# Patient Record
Sex: FEMALE | Race: WHITE | NOT HISPANIC OR LATINO
[De-identification: names, ages, dates, MRNs, and addresses within clinical notes are randomized per-mention and may not be internally consistent; named-entity substitution may affect disease eponyms.]

---

## 2019-04-18 ENCOUNTER — APPOINTMENT (OUTPATIENT)
Dept: OTOLARYNGOLOGY | Facility: CLINIC | Age: 39
End: 2019-04-18
Payer: COMMERCIAL

## 2019-04-18 DIAGNOSIS — Z87.19 PERSONAL HISTORY OF OTHER DISEASES OF THE DIGESTIVE SYSTEM: ICD-10-CM

## 2019-04-18 DIAGNOSIS — J34.2 DEVIATED NASAL SEPTUM: ICD-10-CM

## 2019-04-18 DIAGNOSIS — Z78.9 OTHER SPECIFIED HEALTH STATUS: ICD-10-CM

## 2019-04-18 DIAGNOSIS — Z80.9 FAMILY HISTORY OF MALIGNANT NEOPLASM, UNSPECIFIED: ICD-10-CM

## 2019-04-18 PROBLEM — Z00.00 ENCOUNTER FOR PREVENTIVE HEALTH EXAMINATION: Status: ACTIVE | Noted: 2019-04-18

## 2019-04-18 PROCEDURE — 31238 NSL/SINS NDSC SRG NSL HEMRRG: CPT

## 2019-04-18 PROCEDURE — 99203 OFFICE O/P NEW LOW 30 MIN: CPT | Mod: 25

## 2019-04-18 RX ORDER — BETAMETHASONE DIPROPIONATE 0.5 MG/ML
0.05 LOTION, AUGMENTED TOPICAL
Qty: 60 | Refills: 0 | Status: ACTIVE | COMMUNITY
Start: 2018-11-27

## 2019-04-18 RX ORDER — BIFIDOBACTERIUM LONGUM 10MM CELL
CAPSULE ORAL
Refills: 0 | Status: ACTIVE | COMMUNITY

## 2019-04-18 RX ORDER — ATOVAQUONE AND PROGUANIL HYDROCHLORIDE 250; 100 MG/1; MG/1
250-100 TABLET, FILM COATED ORAL
Qty: 16 | Refills: 0 | Status: ACTIVE | COMMUNITY
Start: 2019-04-12

## 2019-04-18 RX ORDER — CETIRIZINE HYDROCHLORIDE 5 MG/1
5 TABLET ORAL
Refills: 0 | Status: ACTIVE | COMMUNITY

## 2019-04-18 NOTE — HISTORY OF PRESENT ILLNESS
[de-identified] : Patient with history of severe left-sided epistaxis last evening treated with 4 tampons in the nose which she bled through immediately. Reports that there was epistaxis on the opposite side and going down her throat. Presented to urgent care where the bleeding ultimately stopped. Patient had septoplasty in 2016 which improved her per nosebleeds on the left side. No blood thinners, history of high blood pressure, nasal trauma, or other intranasal medications

## 2019-04-18 NOTE — ASSESSMENT
[FreeTextEntry1] : \par \par Epistaxis, status post cautery.  The patient did well and was hemostatic at the conclusion.  I recommended nasal rest, no nose blowing, saline spray, and ointment to be applied for 5 times a day.  The pt will refrain from nose blowing or other manipulation.  I also recommended a humidifier.  The patient will follow up if they have any further bleeding\par \par

## 2019-04-29 ENCOUNTER — APPOINTMENT (OUTPATIENT)
Dept: OTOLARYNGOLOGY | Facility: CLINIC | Age: 39
End: 2019-04-29
Payer: COMMERCIAL

## 2019-04-29 VITALS
HEIGHT: 64 IN | WEIGHT: 112 LBS | HEART RATE: 67 BPM | BODY MASS INDEX: 19.12 KG/M2 | DIASTOLIC BLOOD PRESSURE: 76 MMHG | SYSTOLIC BLOOD PRESSURE: 101 MMHG

## 2019-04-29 PROCEDURE — 99213 OFFICE O/P EST LOW 20 MIN: CPT

## 2019-04-29 NOTE — ASSESSMENT
[FreeTextEntry1] : Epistaxis, status post cautery April 18. I did not recommend repeat cautery on the right side because of the risk of compromising the blood supply to the septum with bilateral cautery. It does not seem severe enough to warrant cautery at this time. Resume normal nasal hygiene\par \par

## 2019-04-29 NOTE — HISTORY OF PRESENT ILLNESS
[de-identified] : Patient Last seen April 18 with history of severe left-sided epistaxis last evening treated with 4 tampons in the nose which she bled through immediately. Reports that there was epistaxis on the opposite side and going down her throat. Presented to urgent care where the bleeding ultimately stopped. Patient had septoplasty in 2016 which improved her per nosebleeds on the left side. No blood thinners, history of high blood pressure, nasal trauma, or other intranasal medications\par \par Cauterized with silver nitrate, no bleeds on the left since then.  Does have some spotting of blood now on the right side.  She is concerned because she will be flying in 2 days

## 2019-12-06 ENCOUNTER — APPOINTMENT (OUTPATIENT)
Dept: ANTEPARTUM | Facility: CLINIC | Age: 39
End: 2019-12-06
Payer: COMMERCIAL

## 2019-12-06 PROCEDURE — 76801 OB US < 14 WKS SINGLE FETUS: CPT

## 2019-12-06 PROCEDURE — 76813 OB US NUCHAL MEAS 1 GEST: CPT

## 2020-01-03 ENCOUNTER — APPOINTMENT (OUTPATIENT)
Dept: ANTEPARTUM | Facility: CLINIC | Age: 40
End: 2020-01-03
Payer: COMMERCIAL

## 2020-01-03 PROCEDURE — 76817 TRANSVAGINAL US OBSTETRIC: CPT

## 2020-01-03 PROCEDURE — 76811 OB US DETAILED SNGL FETUS: CPT

## 2020-01-10 ENCOUNTER — APPOINTMENT (OUTPATIENT)
Dept: ANTEPARTUM | Facility: CLINIC | Age: 40
End: 2020-01-10
Payer: COMMERCIAL

## 2020-01-10 PROCEDURE — 76946 ECHO GUIDE FOR AMNIOCENTESIS: CPT

## 2020-01-10 PROCEDURE — 59000 AMNIOCENTESIS DIAGNOSTIC: CPT

## 2020-01-15 ENCOUNTER — TRANSCRIPTION ENCOUNTER (OUTPATIENT)
Age: 40
End: 2020-01-15

## 2020-01-31 ENCOUNTER — APPOINTMENT (OUTPATIENT)
Dept: ANTEPARTUM | Facility: CLINIC | Age: 40
End: 2020-01-31
Payer: COMMERCIAL

## 2020-01-31 PROCEDURE — 76811 OB US DETAILED SNGL FETUS: CPT

## 2020-01-31 PROCEDURE — 76817 TRANSVAGINAL US OBSTETRIC: CPT

## 2020-04-09 ENCOUNTER — APPOINTMENT (OUTPATIENT)
Dept: ANTEPARTUM | Facility: CLINIC | Age: 40
End: 2020-04-09
Payer: COMMERCIAL

## 2020-04-09 PROCEDURE — 76816 OB US FOLLOW-UP PER FETUS: CPT

## 2020-04-09 PROCEDURE — 76819 FETAL BIOPHYS PROFIL W/O NST: CPT

## 2020-05-08 ENCOUNTER — APPOINTMENT (OUTPATIENT)
Dept: ANTEPARTUM | Facility: CLINIC | Age: 40
End: 2020-05-08
Payer: COMMERCIAL

## 2020-05-08 PROCEDURE — 76819 FETAL BIOPHYS PROFIL W/O NST: CPT

## 2020-05-08 PROCEDURE — 76816 OB US FOLLOW-UP PER FETUS: CPT

## 2020-05-25 ENCOUNTER — OUTPATIENT (OUTPATIENT)
Dept: OUTPATIENT SERVICES | Facility: HOSPITAL | Age: 40
LOS: 1 days | End: 2020-05-25
Payer: COMMERCIAL

## 2020-05-25 DIAGNOSIS — O26.899 OTHER SPECIFIED PREGNANCY RELATED CONDITIONS, UNSPECIFIED TRIMESTER: ICD-10-CM

## 2020-05-25 DIAGNOSIS — Z3A.00 WEEKS OF GESTATION OF PREGNANCY NOT SPECIFIED: ICD-10-CM

## 2020-05-25 LAB
APPEARANCE UR: CLEAR — SIGNIFICANT CHANGE UP
BILIRUB UR-MCNC: NEGATIVE — SIGNIFICANT CHANGE UP
COLOR SPEC: YELLOW — SIGNIFICANT CHANGE UP
DIFF PNL FLD: NEGATIVE — SIGNIFICANT CHANGE UP
GLUCOSE UR QL: NEGATIVE — SIGNIFICANT CHANGE UP
HCT VFR BLD CALC: 43.4 % — SIGNIFICANT CHANGE UP (ref 34.5–45)
HGB BLD-MCNC: 14.5 G/DL — SIGNIFICANT CHANGE UP (ref 11.5–15.5)
KETONES UR-MCNC: 15 MG/DL
LEUKOCYTE ESTERASE UR-ACNC: NEGATIVE — SIGNIFICANT CHANGE UP
MCHC RBC-ENTMCNC: 31.2 PG — SIGNIFICANT CHANGE UP (ref 27–34)
MCHC RBC-ENTMCNC: 33.4 GM/DL — SIGNIFICANT CHANGE UP (ref 32–36)
MCV RBC AUTO: 93.3 FL — SIGNIFICANT CHANGE UP (ref 80–100)
NITRITE UR-MCNC: NEGATIVE — SIGNIFICANT CHANGE UP
NRBC # BLD: 0 /100 WBCS — SIGNIFICANT CHANGE UP (ref 0–0)
PH UR: 6 — SIGNIFICANT CHANGE UP (ref 5–8)
PLATELET # BLD AUTO: 190 K/UL — SIGNIFICANT CHANGE UP (ref 150–400)
PROT UR-MCNC: NEGATIVE MG/DL — SIGNIFICANT CHANGE UP
RBC # BLD: 4.65 M/UL — SIGNIFICANT CHANGE UP (ref 3.8–5.2)
RBC # FLD: 12.7 % — SIGNIFICANT CHANGE UP (ref 10.3–14.5)
SP GR SPEC: 1.01 — SIGNIFICANT CHANGE UP (ref 1–1.03)
UROBILINOGEN FLD QL: 0.2 E.U./DL — SIGNIFICANT CHANGE UP
WBC # BLD: 11.61 K/UL — HIGH (ref 3.8–10.5)
WBC # FLD AUTO: 11.61 K/UL — HIGH (ref 3.8–10.5)

## 2020-05-25 PROCEDURE — 81003 URINALYSIS AUTO W/O SCOPE: CPT

## 2020-05-25 PROCEDURE — 96360 HYDRATION IV INFUSION INIT: CPT

## 2020-05-25 PROCEDURE — 76818 FETAL BIOPHYS PROFILE W/NST: CPT

## 2020-05-25 PROCEDURE — 99214 OFFICE O/P EST MOD 30 MIN: CPT

## 2020-05-25 PROCEDURE — 94760 N-INVAS EAR/PLS OXIMETRY 1: CPT

## 2020-05-25 PROCEDURE — 36415 COLL VENOUS BLD VENIPUNCTURE: CPT

## 2020-05-25 PROCEDURE — 85027 COMPLETE CBC AUTOMATED: CPT

## 2020-05-25 RX ORDER — SODIUM CHLORIDE 9 MG/ML
1000 INJECTION, SOLUTION INTRAVENOUS ONCE
Refills: 0 | Status: COMPLETED | OUTPATIENT
Start: 2020-05-25 | End: 2020-05-25

## 2020-05-25 RX ADMIN — SODIUM CHLORIDE 1000 MILLILITER(S): 9 INJECTION, SOLUTION INTRAVENOUS at 17:30

## 2020-05-26 ENCOUNTER — APPOINTMENT (OUTPATIENT)
Dept: ANTEPARTUM | Facility: CLINIC | Age: 40
End: 2020-05-26
Payer: COMMERCIAL

## 2020-05-26 PROCEDURE — 76816 OB US FOLLOW-UP PER FETUS: CPT

## 2020-05-26 PROCEDURE — 76819 FETAL BIOPHYS PROFIL W/O NST: CPT

## 2020-06-02 ENCOUNTER — APPOINTMENT (OUTPATIENT)
Dept: ANTEPARTUM | Facility: CLINIC | Age: 40
End: 2020-06-02
Payer: COMMERCIAL

## 2020-06-02 ENCOUNTER — INPATIENT (INPATIENT)
Facility: HOSPITAL | Age: 40
LOS: 2 days | Discharge: HOME | End: 2020-06-05
Attending: OBSTETRICS & GYNECOLOGY | Admitting: OBSTETRICS & GYNECOLOGY
Payer: COMMERCIAL

## 2020-06-02 VITALS — HEIGHT: 64 IN | WEIGHT: 138.01 LBS

## 2020-06-02 DIAGNOSIS — Z3A.00 WEEKS OF GESTATION OF PREGNANCY NOT SPECIFIED: ICD-10-CM

## 2020-06-02 DIAGNOSIS — O26.899 OTHER SPECIFIED PREGNANCY RELATED CONDITIONS, UNSPECIFIED TRIMESTER: ICD-10-CM

## 2020-06-02 LAB
BASOPHILS # BLD AUTO: 0.02 K/UL — SIGNIFICANT CHANGE UP (ref 0–0.2)
BASOPHILS NFR BLD AUTO: 0.2 % — SIGNIFICANT CHANGE UP (ref 0–2)
EOSINOPHIL # BLD AUTO: 0.01 K/UL — SIGNIFICANT CHANGE UP (ref 0–0.5)
EOSINOPHIL NFR BLD AUTO: 0.1 % — SIGNIFICANT CHANGE UP (ref 0–6)
HCT VFR BLD CALC: 39.9 % — SIGNIFICANT CHANGE UP (ref 34.5–45)
HGB BLD-MCNC: 13.6 G/DL — SIGNIFICANT CHANGE UP (ref 11.5–15.5)
IMM GRANULOCYTES NFR BLD AUTO: 0.7 % — SIGNIFICANT CHANGE UP (ref 0–1.5)
LYMPHOCYTES # BLD AUTO: 1.4 K/UL — SIGNIFICANT CHANGE UP (ref 1–3.3)
LYMPHOCYTES # BLD AUTO: 15.2 % — SIGNIFICANT CHANGE UP (ref 13–44)
MCHC RBC-ENTMCNC: 31.6 PG — SIGNIFICANT CHANGE UP (ref 27–34)
MCHC RBC-ENTMCNC: 34.1 GM/DL — SIGNIFICANT CHANGE UP (ref 32–36)
MCV RBC AUTO: 92.6 FL — SIGNIFICANT CHANGE UP (ref 80–100)
MONOCYTES # BLD AUTO: 0.55 K/UL — SIGNIFICANT CHANGE UP (ref 0–0.9)
MONOCYTES NFR BLD AUTO: 6 % — SIGNIFICANT CHANGE UP (ref 2–14)
NEUTROPHILS # BLD AUTO: 7.18 K/UL — SIGNIFICANT CHANGE UP (ref 1.8–7.4)
NEUTROPHILS NFR BLD AUTO: 77.8 % — HIGH (ref 43–77)
NRBC # BLD: 0 /100 WBCS — SIGNIFICANT CHANGE UP (ref 0–0)
PLATELET # BLD AUTO: 193 K/UL — SIGNIFICANT CHANGE UP (ref 150–400)
RBC # BLD: 4.31 M/UL — SIGNIFICANT CHANGE UP (ref 3.8–5.2)
RBC # FLD: 12.4 % — SIGNIFICANT CHANGE UP (ref 10.3–14.5)
WBC # BLD: 9.22 K/UL — SIGNIFICANT CHANGE UP (ref 3.8–10.5)
WBC # FLD AUTO: 9.22 K/UL — SIGNIFICANT CHANGE UP (ref 3.8–10.5)

## 2020-06-02 PROCEDURE — 76816 OB US FOLLOW-UP PER FETUS: CPT

## 2020-06-02 PROCEDURE — 76819 FETAL BIOPHYS PROFIL W/O NST: CPT

## 2020-06-02 RX ORDER — OXYTOCIN 10 UNIT/ML
2 VIAL (ML) INJECTION
Qty: 30 | Refills: 0 | Status: DISCONTINUED | OUTPATIENT
Start: 2020-06-02 | End: 2020-06-03

## 2020-06-02 RX ORDER — SODIUM CHLORIDE 9 MG/ML
1000 INJECTION, SOLUTION INTRAVENOUS
Refills: 0 | Status: DISCONTINUED | OUTPATIENT
Start: 2020-06-02 | End: 2020-06-03

## 2020-06-02 RX ORDER — CITRIC ACID/SODIUM CITRATE 300-500 MG
15 SOLUTION, ORAL ORAL ONCE
Refills: 0 | Status: DISCONTINUED | OUTPATIENT
Start: 2020-06-02 | End: 2020-06-03

## 2020-06-02 RX ORDER — OXYTOCIN 10 UNIT/ML
333.33 VIAL (ML) INJECTION
Qty: 20 | Refills: 0 | Status: DISCONTINUED | OUTPATIENT
Start: 2020-06-02 | End: 2020-06-03

## 2020-06-03 ENCOUNTER — RESULT REVIEW (OUTPATIENT)
Age: 40
End: 2020-06-03

## 2020-06-03 LAB
BLD GP AB SCN SERPL QL: NEGATIVE — SIGNIFICANT CHANGE UP
RH IG SCN BLD-IMP: POSITIVE — SIGNIFICANT CHANGE UP
RH IG SCN BLD-IMP: POSITIVE — SIGNIFICANT CHANGE UP
SARS-COV-2 RNA SPEC QL NAA+PROBE: SIGNIFICANT CHANGE UP
T PALLIDUM AB TITR SER: NEGATIVE — SIGNIFICANT CHANGE UP

## 2020-06-03 PROCEDURE — 88307 TISSUE EXAM BY PATHOLOGIST: CPT | Mod: 26

## 2020-06-03 RX ORDER — SIMETHICONE 80 MG/1
80 TABLET, CHEWABLE ORAL EVERY 4 HOURS
Refills: 0 | Status: DISCONTINUED | OUTPATIENT
Start: 2020-06-03 | End: 2020-06-05

## 2020-06-03 RX ORDER — ACETAMINOPHEN 500 MG
1000 TABLET ORAL ONCE
Refills: 0 | Status: COMPLETED | OUTPATIENT
Start: 2020-06-03 | End: 2020-06-03

## 2020-06-03 RX ORDER — GENTAMICIN SULFATE 40 MG/ML
240 VIAL (ML) INJECTION EVERY 24 HOURS
Refills: 0 | Status: DISCONTINUED | OUTPATIENT
Start: 2020-06-04 | End: 2020-06-04

## 2020-06-03 RX ORDER — HYDROCORTISONE 1 %
1 OINTMENT (GRAM) TOPICAL EVERY 6 HOURS
Refills: 0 | Status: DISCONTINUED | OUTPATIENT
Start: 2020-06-03 | End: 2020-06-05

## 2020-06-03 RX ORDER — GENTAMICIN SULFATE 40 MG/ML
VIAL (ML) INJECTION
Refills: 0 | Status: DISCONTINUED | OUTPATIENT
Start: 2020-06-03 | End: 2020-06-04

## 2020-06-03 RX ORDER — OXYTOCIN 10 UNIT/ML
2 VIAL (ML) INJECTION
Qty: 30 | Refills: 0 | Status: DISCONTINUED | OUTPATIENT
Start: 2020-06-03 | End: 2020-06-03

## 2020-06-03 RX ORDER — SODIUM CHLORIDE 9 MG/ML
3 INJECTION INTRAMUSCULAR; INTRAVENOUS; SUBCUTANEOUS EVERY 8 HOURS
Refills: 0 | Status: DISCONTINUED | OUTPATIENT
Start: 2020-06-03 | End: 2020-06-05

## 2020-06-03 RX ORDER — DIBUCAINE 1 %
1 OINTMENT (GRAM) RECTAL EVERY 6 HOURS
Refills: 0 | Status: DISCONTINUED | OUTPATIENT
Start: 2020-06-03 | End: 2020-06-05

## 2020-06-03 RX ORDER — MAGNESIUM HYDROXIDE 400 MG/1
30 TABLET, CHEWABLE ORAL
Refills: 0 | Status: DISCONTINUED | OUTPATIENT
Start: 2020-06-03 | End: 2020-06-05

## 2020-06-03 RX ORDER — FAMOTIDINE 10 MG/ML
20 INJECTION INTRAVENOUS ONCE
Refills: 0 | Status: COMPLETED | OUTPATIENT
Start: 2020-06-03 | End: 2020-06-04

## 2020-06-03 RX ORDER — PRAMOXINE HYDROCHLORIDE 150 MG/15G
1 AEROSOL, FOAM RECTAL EVERY 4 HOURS
Refills: 0 | Status: DISCONTINUED | OUTPATIENT
Start: 2020-06-03 | End: 2020-06-05

## 2020-06-03 RX ORDER — IBUPROFEN 200 MG
600 TABLET ORAL EVERY 6 HOURS
Refills: 0 | Status: COMPLETED | OUTPATIENT
Start: 2020-06-03 | End: 2021-05-02

## 2020-06-03 RX ORDER — BENZOCAINE 10 %
1 GEL (GRAM) MUCOUS MEMBRANE EVERY 6 HOURS
Refills: 0 | Status: DISCONTINUED | OUTPATIENT
Start: 2020-06-03 | End: 2020-06-05

## 2020-06-03 RX ORDER — TETANUS TOXOID, REDUCED DIPHTHERIA TOXOID AND ACELLULAR PERTUSSIS VACCINE, ADSORBED 5; 2.5; 8; 8; 2.5 [IU]/.5ML; [IU]/.5ML; UG/.5ML; UG/.5ML; UG/.5ML
0.5 SUSPENSION INTRAMUSCULAR ONCE
Refills: 0 | Status: DISCONTINUED | OUTPATIENT
Start: 2020-06-03 | End: 2020-06-05

## 2020-06-03 RX ORDER — GENTAMICIN SULFATE 40 MG/ML
240 VIAL (ML) INJECTION ONCE
Refills: 0 | Status: COMPLETED | OUTPATIENT
Start: 2020-06-03 | End: 2020-06-03

## 2020-06-03 RX ORDER — OXYCODONE HYDROCHLORIDE 5 MG/1
5 TABLET ORAL
Refills: 0 | Status: DISCONTINUED | OUTPATIENT
Start: 2020-06-03 | End: 2020-06-05

## 2020-06-03 RX ORDER — FENTANYL/BUPIVACAINE/NS/PF 2MCG/ML-.1
250 PLASTIC BAG, INJECTION (ML) INJECTION
Refills: 0 | Status: DISCONTINUED | OUTPATIENT
Start: 2020-06-03 | End: 2020-06-03

## 2020-06-03 RX ORDER — OXYTOCIN 10 UNIT/ML
333.33 VIAL (ML) INJECTION
Qty: 20 | Refills: 0 | Status: DISCONTINUED | OUTPATIENT
Start: 2020-06-03 | End: 2020-06-05

## 2020-06-03 RX ORDER — DIPHENHYDRAMINE HCL 50 MG
25 CAPSULE ORAL EVERY 6 HOURS
Refills: 0 | Status: DISCONTINUED | OUTPATIENT
Start: 2020-06-03 | End: 2020-06-05

## 2020-06-03 RX ORDER — OXYCODONE HYDROCHLORIDE 5 MG/1
5 TABLET ORAL ONCE
Refills: 0 | Status: DISCONTINUED | OUTPATIENT
Start: 2020-06-03 | End: 2020-06-05

## 2020-06-03 RX ORDER — METRONIDAZOLE 500 MG
500 TABLET ORAL EVERY 8 HOURS
Refills: 0 | Status: DISCONTINUED | OUTPATIENT
Start: 2020-06-03 | End: 2020-06-04

## 2020-06-03 RX ORDER — KETOROLAC TROMETHAMINE 30 MG/ML
30 SYRINGE (ML) INJECTION ONCE
Refills: 0 | Status: DISCONTINUED | OUTPATIENT
Start: 2020-06-03 | End: 2020-06-03

## 2020-06-03 RX ORDER — ACETAMINOPHEN 500 MG
975 TABLET ORAL
Refills: 0 | Status: DISCONTINUED | OUTPATIENT
Start: 2020-06-03 | End: 2020-06-05

## 2020-06-03 RX ORDER — LANOLIN
1 OINTMENT (GRAM) TOPICAL EVERY 6 HOURS
Refills: 0 | Status: DISCONTINUED | OUTPATIENT
Start: 2020-06-03 | End: 2020-06-05

## 2020-06-03 RX ORDER — AER TRAVELER 0.5 G/1
1 SOLUTION RECTAL; TOPICAL EVERY 4 HOURS
Refills: 0 | Status: DISCONTINUED | OUTPATIENT
Start: 2020-06-03 | End: 2020-06-05

## 2020-06-03 RX ADMIN — SODIUM CHLORIDE 3 MILLILITER(S): 9 INJECTION INTRAMUSCULAR; INTRAVENOUS; SUBCUTANEOUS at 14:38

## 2020-06-03 RX ADMIN — Medication 500 MILLIGRAM(S): at 23:06

## 2020-06-03 RX ADMIN — Medication 500 MILLIGRAM(S): at 14:54

## 2020-06-03 RX ADMIN — Medication 212 MILLIGRAM(S): at 08:55

## 2020-06-03 RX ADMIN — Medication 400 MILLIGRAM(S): at 10:30

## 2020-06-03 RX ADMIN — Medication 1 SPRAY(S): at 23:11

## 2020-06-03 RX ADMIN — Medication 975 MILLIGRAM(S): at 23:06

## 2020-06-03 RX ADMIN — Medication 2 MILLIUNIT(S)/MIN: at 01:36

## 2020-06-03 RX ADMIN — Medication 30 MILLIGRAM(S): at 17:50

## 2020-06-03 RX ADMIN — Medication 1 APPLICATION(S): at 23:11

## 2020-06-03 NOTE — LACTATION INITIAL EVALUATION - AS DELIV COMPLICATIONS OB
respiratory distress/premature rupture of membranes prior to labor/nuchal cord/maternal fever/shoulder dystocia

## 2020-06-04 ENCOUNTER — TRANSCRIPTION ENCOUNTER (OUTPATIENT)
Age: 40
End: 2020-06-04

## 2020-06-04 RX ORDER — IBUPROFEN 200 MG
600 TABLET ORAL EVERY 6 HOURS
Refills: 0 | Status: DISCONTINUED | OUTPATIENT
Start: 2020-06-04 | End: 2020-06-05

## 2020-06-04 RX ORDER — FAMOTIDINE 10 MG/ML
40 INJECTION INTRAVENOUS
Refills: 0 | Status: DISCONTINUED | OUTPATIENT
Start: 2020-06-04 | End: 2020-06-04

## 2020-06-04 RX ORDER — FAMOTIDINE 10 MG/ML
20 INJECTION INTRAVENOUS
Refills: 0 | Status: DISCONTINUED | OUTPATIENT
Start: 2020-06-04 | End: 2020-06-05

## 2020-06-04 RX ADMIN — Medication 975 MILLIGRAM(S): at 06:52

## 2020-06-04 RX ADMIN — SODIUM CHLORIDE 3 MILLILITER(S): 9 INJECTION INTRAMUSCULAR; INTRAVENOUS; SUBCUTANEOUS at 02:41

## 2020-06-04 RX ADMIN — Medication 975 MILLIGRAM(S): at 18:59

## 2020-06-04 RX ADMIN — SODIUM CHLORIDE 3 MILLILITER(S): 9 INJECTION INTRAMUSCULAR; INTRAVENOUS; SUBCUTANEOUS at 13:58

## 2020-06-04 RX ADMIN — FAMOTIDINE 20 MILLIGRAM(S): 10 INJECTION INTRAVENOUS at 02:40

## 2020-06-04 RX ADMIN — Medication 975 MILLIGRAM(S): at 12:57

## 2020-06-04 NOTE — DISCHARGE NOTE OB - CARE PROVIDER_API CALL
Lexy Torres)  Obstetrics and Gynecology  [address] [place] [address], [place] [address]  Phone: [phone]  Fax: [phone]  Follow Up Time:

## 2020-06-04 NOTE — DISCHARGE NOTE OB - PATIENT PORTAL LINK FT
You can access the FollowMyHealth Patient Portal offered by [facility] by registering at the following website: [email or web address]. By joining [employer]’s FollowMyHealth portal, you will also be able to view your health information using other applications (apps) compatible with our system.

## 2020-06-04 NOTE — DISCHARGE NOTE OB - CARE PLAN
Principal Discharge DX:	Postpartum care following vaginal delivery  Goal:	healthy recovery  Assessment and plan of treatment:	follow up in office in 2 weeks

## 2020-06-05 VITALS
HEART RATE: 60 BPM | SYSTOLIC BLOOD PRESSURE: 108 MMHG | TEMPERATURE: 98 F | DIASTOLIC BLOOD PRESSURE: 72 MMHG | RESPIRATION RATE: 16 BRPM | OXYGEN SATURATION: 98 %

## 2020-06-05 LAB
CULTURE RESULTS: NO GROWTH — SIGNIFICANT CHANGE UP
SPECIMEN SOURCE: SIGNIFICANT CHANGE UP
SURGICAL PATHOLOGY STUDY: SIGNIFICANT CHANGE UP

## 2020-06-05 PROCEDURE — 99214 OFFICE O/P EST MOD 30 MIN: CPT

## 2020-06-05 PROCEDURE — 36415 COLL VENOUS BLD VENIPUNCTURE: CPT

## 2020-06-05 PROCEDURE — 86780 TREPONEMA PALLIDUM: CPT

## 2020-06-05 PROCEDURE — 86850 RBC ANTIBODY SCREEN: CPT

## 2020-06-05 PROCEDURE — 87086 URINE CULTURE/COLONY COUNT: CPT

## 2020-06-05 PROCEDURE — 86901 BLOOD TYPING SEROLOGIC RH(D): CPT

## 2020-06-05 PROCEDURE — 87635 SARS-COV-2 COVID-19 AMP PRB: CPT

## 2020-06-05 PROCEDURE — 85025 COMPLETE CBC W/AUTO DIFF WBC: CPT

## 2020-06-05 PROCEDURE — 88307 TISSUE EXAM BY PATHOLOGIST: CPT

## 2020-06-05 RX ADMIN — Medication 975 MILLIGRAM(S): at 10:27

## 2020-06-05 RX ADMIN — Medication 975 MILLIGRAM(S): at 00:44

## 2020-06-05 RX ADMIN — FAMOTIDINE 20 MILLIGRAM(S): 10 INJECTION INTRAVENOUS at 00:44

## 2020-06-05 NOTE — PROGRESS NOTE ADULT - ASSESSMENT
A/P 40y s/p , PPD #2, stable, meeting postpartum milestones . Course complicated by Triple I.  - Triple I: s/p gent/flagyl, afebrile  - Pain: well controlled on tylenol and motrin  - GI: Tolerating regular diet, colace PRN  - : urinating without difficulty/pain  -DVT prophylaxis: ambulating frequently  -Dispo: PPD 2, unless otherwise specified
A/P 40y s/p , PPD # 2, intrapartum course c/b intraamniotic infection- now afebrile & s/p abx, shoulder dystocia- pt meeting postpartum milestones, stable for d/c home.    1. Pain: well controlled on OPM  2. GI: Regular diet  3. : voiding spontaneously  4. DVT prophylaxis: SCDs, ambulate  5. Dispo: PPD 2, unless otherwise specified
A/P 40y s/p , PPD #1, stable, meeting postpartum milestones. Course complicated by Triple I.  - Triple I: on Gent/Flagyl for 24hr after delivery. Discontinue at 0745  - Pain: well controlled on tylenol and motrin  - GI: Tolerating regular diet, colace PRN  - : urinating without difficulty/pain  -DVT prophylaxis: ambulating frequently  -Dispo: PPD 2, unless otherwise specified

## 2020-06-05 NOTE — PROGRESS NOTE ADULT - SUBJECTIVE AND OBJECTIVE BOX
Patient evaluated at bedside this morning, resting comfortable in bed, no acute events overnight.  She reports pain is well controlled with tylenol and motrin  She denies headache, dizziness, chest pain, palpitations, shortness of breath, nausea, vomiting, heavy vaginal bleeding or perineal discomfort. Reports decrease in amount of vaginal bleeding and denies clots.  She has been ambulating without assistance, voiding spontaneously, and is breastfeeding.   Tolerating food well, without nausea/vomit.  Passing flatus.     Physical Exam:  T(C): 36.4 (06-05-20 @ 05:30), Max: 36.4 (06-05-20 @ 05:30)  HR: 59 (06-05-20 @ 05:30) (59 - 59)  BP: 103/65 (06-05-20 @ 05:30) (103/65 - 103/65)  RR: 17 (06-05-20 @ 05:30) (17 - 17)  SpO2: 98% (06-05-20 @ 05:30) (98% - 98%)    GA: NAD, A&O x 3  Abd: + BS, soft, nontender, nondistended, no rebound or guarding, uterus firm at midline, and 1fb below umbilicus  Perineum: intact, minimal swelling, small amount of bleeding on pad, no clots  Extremities: no swelling or calf tenderness            acetaminophen   Tablet .. 975 milliGRAM(s) Oral <User Schedule>  benzocaine 20%/menthol 0.5% Spray 1 Spray(s) Topical every 6 hours PRN  dibucaine 1% Ointment 1 Application(s) Topical every 6 hours PRN  diphenhydrAMINE 25 milliGRAM(s) Oral every 6 hours PRN  diphtheria/tetanus/pertussis (acellular) Vaccine (ADAcel) 0.5 milliLiter(s) IntraMuscular once  famotidine    Tablet 20 milliGRAM(s) Oral two times a day  hydrocortisone 1% Cream 1 Application(s) Topical every 6 hours PRN  ibuprofen  Tablet. 600 milliGRAM(s) Oral every 6 hours  lanolin Ointment 1 Application(s) Topical every 6 hours PRN  magnesium hydroxide Suspension 30 milliLiter(s) Oral two times a day PRN  oxyCODONE    IR 5 milliGRAM(s) Oral every 3 hours PRN  oxyCODONE    IR 5 milliGRAM(s) Oral once PRN  oxytocin Infusion 333.333 milliUNIT(s)/Min IV Continuous <Continuous>  pramoxine 1%/zinc 5% Cream 1 Application(s) Topical every 4 hours PRN  prenatal multivitamin 1 Tablet(s) Oral daily  simethicone 80 milliGRAM(s) Chew every 4 hours PRN  sodium chloride 0.9% lock flush 3 milliLiter(s) IV Push every 8 hours  witch hazel Pads 1 Application(s) Topical every 4 hours PRN
Patient evaluated at bedside this morning, resting comfortable in bed, no acute events overnight.  She reports pain is well controlled with tylenol and motrin  She denies headache, dizziness, chest pain, palpitations, shortness of breath, nausea, vomiting, heavy vaginal bleeding or perineal discomfort. Reports decrease in amount of vaginal bleeding and denies clots.  She has been ambulating without assistance, voiding spontaneously, and is breastfeeding.   Tolerating food well, without nausea/vomit.  Passing flatus.     Physical Exam:  T(C): 36.7 (06-04-20 @ 02:28), Max: 36.7 (06-04-20 @ 02:28)  HR: 60 (06-04-20 @ 02:28) (60 - 60)  BP: 127/80 (06-04-20 @ 02:28) (97/64 - 127/80)  RR: 17 (06-04-20 @ 02:28) (17 - 17)  SpO2: 100% (06-04-20 @ 02:28) (100% - 100%)    GA: NAD, A&O x 3  Abd: + BS, soft, nontender, nondistended, no rebound or guarding, uterus firm at midline, and 1fb below umbilicus  Perineum: intact, minimal swelling, small amount of bleeding on pad, no clots  Extremities: no swelling or calf tenderness                          13.6   9.22  )-----------( 193      ( 02 Jun 2020 23:34 )             39.9           acetaminophen   Tablet .. 975 milliGRAM(s) Oral <User Schedule>  benzocaine 20%/menthol 0.5% Spray 1 Spray(s) Topical every 6 hours PRN  dibucaine 1% Ointment 1 Application(s) Topical every 6 hours PRN  diphenhydrAMINE 25 milliGRAM(s) Oral every 6 hours PRN  diphtheria/tetanus/pertussis (acellular) Vaccine (ADAcel) 0.5 milliLiter(s) IntraMuscular once  gentamicin   IVPB      gentamicin   IVPB 240 milliGRAM(s) IV Intermittent every 24 hours  hydrocortisone 1% Cream 1 Application(s) Topical every 6 hours PRN  ibuprofen  Tablet. 600 milliGRAM(s) Oral every 6 hours  lanolin Ointment 1 Application(s) Topical every 6 hours PRN  magnesium hydroxide Suspension 30 milliLiter(s) Oral two times a day PRN  metroNIDAZOLE    Tablet 500 milliGRAM(s) Oral every 8 hours  oxyCODONE    IR 5 milliGRAM(s) Oral every 3 hours PRN  oxyCODONE    IR 5 milliGRAM(s) Oral once PRN  oxytocin Infusion 333.333 milliUNIT(s)/Min IV Continuous <Continuous>  pramoxine 1%/zinc 5% Cream 1 Application(s) Topical every 4 hours PRN  prenatal multivitamin 1 Tablet(s) Oral daily  simethicone 80 milliGRAM(s) Chew every 4 hours PRN  sodium chloride 0.9% lock flush 3 milliLiter(s) IV Push every 8 hours  witch hazel Pads 1 Application(s) Topical every 4 hours PRN
Patient evaluated at bedside.   She reports pain is well controlled with OPM  She denies headache, dizziness, chest pain, palpitations, shortness of breath, nausea, vomiting, heavy vaginal bleeding or perineal discomfort.  She has been ambulating without assistance, voiding spontaneously, and is breastfeeding.    Physical Exam:  T(C): 36.5 (20 @ 08:00), Max: 36.7 (20 @ 02:28)  HR: 55 (20 @ 06:30) (55 - 60)  BP: 113/74 (20 @ 06:30) (113/74 - 127/80)  RR: 17 (20 @ 06:30) (17 - 17)  SpO2: 100% (20 @ 06:30) (100% - 100%)    GA: NAD, A+0 x 3  Breasts: soft, nontender, no palpable masses  Abd: + BS, soft, nontender, nondistended, no rebound or guarding, uterus firm at midline, 2  fb below umbilicus  : lochia WNL  Extremities: no swelling or calf tenderness                            13.6   9.22  )-----------( 193      ( 2020 23:34 )             39.9         A/P 40y s/p , PPD #1  ,stable  1. Pain: well controlled on OPM  2. GI: Regular diet  3. : s.p leonard  4. DVT prophylaxis: SCDs, ambulate  5. Dispo: PPD 2, unless otherwise specified
Patient evaluated at bedside.   She reports pain is well controlled with OPM  She denies headache, dizziness, chest pain, palpitations, shortness of breathe, nausea, vomiting, heavy vaginal bleeding or perineal discomfort.  She has been ambulating without assistance, voiding spontaneously, and is breastfeeding.    Physical Exam:  T(C): 36.4 (06-05-20 @ 05:30), Max: 36.4 (06-05-20 @ 05:30)  HR: 59 (06-05-20 @ 05:30) (59 - 59)  BP: 103/65 (06-05-20 @ 05:30) (103/65 - 103/65)  RR: 17 (06-05-20 @ 05:30) (17 - 17)  SpO2: 98% (06-05-20 @ 05:30) (98% - 98%)    GA: NAD, A+0 x 3  Breasts: soft, nontender, no palpable masses  Abd: + BS, soft, nontender, nondistended, no rebound or guarding, uterus firm at midline, 2  fb below umbilicus  : lochia WNL  Extremities: no swelling or calf tenderness
Patient evaluated at bedside.   She reports pain is well controlled with OPM.  She has been ambulating without assistance, voiding spontaneously, passing gas, tolerating regular diet and is breastfeeding.    She denies HA, dizziness, CP, palpitations, SOB, n/v, or heavy vaginal bleeding.    Physical Exam:  vss  Gen: NAD  Abd: + BS, soft, nontender, nondistended, no rebound or guarding  uterus firm at midline,   fb below umbilicus  : lochia WNL  Extremities: no swelling or calf tenderness

## 2020-06-09 DIAGNOSIS — Z11.59 ENCOUNTER FOR SCREENING FOR OTHER VIRAL DISEASES: ICD-10-CM

## 2020-06-09 DIAGNOSIS — Z3A.38 38 WEEKS GESTATION OF PREGNANCY: ICD-10-CM

## 2020-06-09 DIAGNOSIS — O42.92 FULL-TERM PREMATURE RUPTURE OF MEMBRANES, UNSPECIFIED AS TO LENGTH OF TIME BETWEEN RUPTURE AND ONSET OF LABOR: ICD-10-CM

## 2020-06-09 DIAGNOSIS — O41.1230 CHORIOAMNIONITIS, THIRD TRIMESTER, NOT APPLICABLE OR UNSPECIFIED: ICD-10-CM

## 2020-06-09 DIAGNOSIS — Z34.03 ENCOUNTER FOR SUPERVISION OF NORMAL FIRST PREGNANCY, THIRD TRIMESTER: ICD-10-CM

## 2020-06-09 DIAGNOSIS — Z88.0 ALLERGY STATUS TO PENICILLIN: ICD-10-CM

## 2021-06-26 NOTE — DISCHARGE NOTE OB - IF BREASTFEEDING, HAVE A GLASS OF WATER, JUICE, OR LOW-FAT MILK EACH TIME YOU FEED YOUR BABY
Attempted to assess pt for Bronchial Hygeine Protocol- pt not in room at this time.
not examined
Statement Selected

## 2022-04-14 ENCOUNTER — RESULT REVIEW (OUTPATIENT)
Age: 42
End: 2022-04-14

## 2023-04-09 ENCOUNTER — NON-APPOINTMENT (OUTPATIENT)
Age: 43
End: 2023-04-09

## 2023-06-07 ENCOUNTER — APPOINTMENT (OUTPATIENT)
Dept: ANTEPARTUM | Facility: CLINIC | Age: 43
End: 2023-06-07
Payer: COMMERCIAL

## 2023-06-07 ENCOUNTER — ASOB RESULT (OUTPATIENT)
Age: 43
End: 2023-06-07

## 2023-06-07 PROCEDURE — 76817 TRANSVAGINAL US OBSTETRIC: CPT

## 2023-06-07 PROCEDURE — 76805 OB US >/= 14 WKS SNGL FETUS: CPT

## 2023-06-09 ENCOUNTER — ASOB RESULT (OUTPATIENT)
Age: 43
End: 2023-06-09

## 2023-06-09 ENCOUNTER — APPOINTMENT (OUTPATIENT)
Dept: ANTEPARTUM | Facility: CLINIC | Age: 43
End: 2023-06-09
Payer: COMMERCIAL

## 2023-06-09 PROCEDURE — 59000 AMNIOCENTESIS DIAGNOSTIC: CPT

## 2023-06-09 PROCEDURE — 76946 ECHO GUIDE FOR AMNIOCENTESIS: CPT

## 2023-07-11 ENCOUNTER — ASOB RESULT (OUTPATIENT)
Age: 43
End: 2023-07-11

## 2023-07-11 ENCOUNTER — APPOINTMENT (OUTPATIENT)
Dept: ANTEPARTUM | Facility: CLINIC | Age: 43
End: 2023-07-11
Payer: COMMERCIAL

## 2023-07-11 PROCEDURE — 76811 OB US DETAILED SNGL FETUS: CPT

## 2023-07-11 PROCEDURE — 76817 TRANSVAGINAL US OBSTETRIC: CPT

## 2023-08-24 ENCOUNTER — APPOINTMENT (OUTPATIENT)
Dept: ANTEPARTUM | Facility: CLINIC | Age: 43
End: 2023-08-24
Payer: COMMERCIAL

## 2023-08-24 ENCOUNTER — ASOB RESULT (OUTPATIENT)
Age: 43
End: 2023-08-24

## 2023-08-24 PROCEDURE — 76819 FETAL BIOPHYS PROFIL W/O NST: CPT

## 2023-08-24 PROCEDURE — 76816 OB US FOLLOW-UP PER FETUS: CPT

## 2023-08-24 PROCEDURE — 76820 UMBILICAL ARTERY ECHO: CPT | Mod: 59

## 2023-09-21 ENCOUNTER — ASOB RESULT (OUTPATIENT)
Age: 43
End: 2023-09-21

## 2023-09-21 ENCOUNTER — APPOINTMENT (OUTPATIENT)
Dept: ANTEPARTUM | Facility: CLINIC | Age: 43
End: 2023-09-21
Payer: COMMERCIAL

## 2023-09-21 PROCEDURE — 76816 OB US FOLLOW-UP PER FETUS: CPT

## 2023-09-21 PROCEDURE — 76820 UMBILICAL ARTERY ECHO: CPT | Mod: 59

## 2023-09-21 PROCEDURE — 76819 FETAL BIOPHYS PROFIL W/O NST: CPT | Mod: 59

## 2023-10-17 ENCOUNTER — APPOINTMENT (OUTPATIENT)
Dept: ANTEPARTUM | Facility: CLINIC | Age: 43
End: 2023-10-17
Payer: COMMERCIAL

## 2023-10-17 ENCOUNTER — ASOB RESULT (OUTPATIENT)
Age: 43
End: 2023-10-17

## 2023-10-17 PROCEDURE — 76816 OB US FOLLOW-UP PER FETUS: CPT

## 2023-10-17 PROCEDURE — 76819 FETAL BIOPHYS PROFIL W/O NST: CPT

## 2023-10-17 PROCEDURE — 76820 UMBILICAL ARTERY ECHO: CPT | Mod: 59

## 2023-10-23 ENCOUNTER — ASOB RESULT (OUTPATIENT)
Age: 43
End: 2023-10-23

## 2023-10-23 ENCOUNTER — APPOINTMENT (OUTPATIENT)
Dept: ANTEPARTUM | Facility: CLINIC | Age: 43
End: 2023-10-23
Payer: COMMERCIAL

## 2023-10-23 PROCEDURE — 76819 FETAL BIOPHYS PROFIL W/O NST: CPT

## 2023-10-23 PROCEDURE — 76815 OB US LIMITED FETUS(S): CPT

## 2023-10-23 PROCEDURE — 76820 UMBILICAL ARTERY ECHO: CPT

## 2023-11-02 ENCOUNTER — ASOB RESULT (OUTPATIENT)
Age: 43
End: 2023-11-02

## 2023-11-02 ENCOUNTER — APPOINTMENT (OUTPATIENT)
Dept: ANTEPARTUM | Facility: CLINIC | Age: 43
End: 2023-11-02
Payer: COMMERCIAL

## 2023-11-02 PROCEDURE — 76819 FETAL BIOPHYS PROFIL W/O NST: CPT

## 2023-11-02 PROCEDURE — 76816 OB US FOLLOW-UP PER FETUS: CPT

## 2023-11-09 ENCOUNTER — ASOB RESULT (OUTPATIENT)
Age: 43
End: 2023-11-09

## 2023-11-09 ENCOUNTER — APPOINTMENT (OUTPATIENT)
Dept: ANTEPARTUM | Facility: CLINIC | Age: 43
End: 2023-11-09
Payer: COMMERCIAL

## 2023-11-09 PROCEDURE — 76820 UMBILICAL ARTERY ECHO: CPT

## 2023-11-09 PROCEDURE — 76819 FETAL BIOPHYS PROFIL W/O NST: CPT

## 2023-11-09 PROCEDURE — 76815 OB US LIMITED FETUS(S): CPT

## 2023-11-13 ENCOUNTER — INPATIENT (INPATIENT)
Facility: HOSPITAL | Age: 43
LOS: 1 days | Discharge: HOME | End: 2023-11-15
Attending: OBSTETRICS & GYNECOLOGY | Admitting: OBSTETRICS & GYNECOLOGY
Payer: COMMERCIAL

## 2023-11-13 VITALS — WEIGHT: 139.99 LBS | HEIGHT: 64 IN

## 2023-11-13 LAB
BASOPHILS # BLD AUTO: 0.03 K/UL — SIGNIFICANT CHANGE UP (ref 0–0.2)
BASOPHILS # BLD AUTO: 0.03 K/UL — SIGNIFICANT CHANGE UP (ref 0–0.2)
BASOPHILS NFR BLD AUTO: 0.3 % — SIGNIFICANT CHANGE UP (ref 0–2)
BASOPHILS NFR BLD AUTO: 0.3 % — SIGNIFICANT CHANGE UP (ref 0–2)
BLD GP AB SCN SERPL QL: NEGATIVE — SIGNIFICANT CHANGE UP
BLD GP AB SCN SERPL QL: NEGATIVE — SIGNIFICANT CHANGE UP
EOSINOPHIL # BLD AUTO: 0.03 K/UL — SIGNIFICANT CHANGE UP (ref 0–0.5)
EOSINOPHIL # BLD AUTO: 0.03 K/UL — SIGNIFICANT CHANGE UP (ref 0–0.5)
EOSINOPHIL NFR BLD AUTO: 0.3 % — SIGNIFICANT CHANGE UP (ref 0–6)
EOSINOPHIL NFR BLD AUTO: 0.3 % — SIGNIFICANT CHANGE UP (ref 0–6)
HCT VFR BLD CALC: 38.7 % — SIGNIFICANT CHANGE UP (ref 34.5–45)
HCT VFR BLD CALC: 38.7 % — SIGNIFICANT CHANGE UP (ref 34.5–45)
HGB BLD-MCNC: 13.6 G/DL — SIGNIFICANT CHANGE UP (ref 11.5–15.5)
HGB BLD-MCNC: 13.6 G/DL — SIGNIFICANT CHANGE UP (ref 11.5–15.5)
IMM GRANULOCYTES NFR BLD AUTO: 0.8 % — SIGNIFICANT CHANGE UP (ref 0–0.9)
IMM GRANULOCYTES NFR BLD AUTO: 0.8 % — SIGNIFICANT CHANGE UP (ref 0–0.9)
LYMPHOCYTES # BLD AUTO: 1.58 K/UL — SIGNIFICANT CHANGE UP (ref 1–3.3)
LYMPHOCYTES # BLD AUTO: 1.58 K/UL — SIGNIFICANT CHANGE UP (ref 1–3.3)
LYMPHOCYTES # BLD AUTO: 14.4 % — SIGNIFICANT CHANGE UP (ref 13–44)
LYMPHOCYTES # BLD AUTO: 14.4 % — SIGNIFICANT CHANGE UP (ref 13–44)
MCHC RBC-ENTMCNC: 33.1 PG — SIGNIFICANT CHANGE UP (ref 27–34)
MCHC RBC-ENTMCNC: 33.1 PG — SIGNIFICANT CHANGE UP (ref 27–34)
MCHC RBC-ENTMCNC: 35.1 GM/DL — SIGNIFICANT CHANGE UP (ref 32–36)
MCHC RBC-ENTMCNC: 35.1 GM/DL — SIGNIFICANT CHANGE UP (ref 32–36)
MCV RBC AUTO: 94.2 FL — SIGNIFICANT CHANGE UP (ref 80–100)
MCV RBC AUTO: 94.2 FL — SIGNIFICANT CHANGE UP (ref 80–100)
MONOCYTES # BLD AUTO: 0.61 K/UL — SIGNIFICANT CHANGE UP (ref 0–0.9)
MONOCYTES # BLD AUTO: 0.61 K/UL — SIGNIFICANT CHANGE UP (ref 0–0.9)
MONOCYTES NFR BLD AUTO: 5.6 % — SIGNIFICANT CHANGE UP (ref 2–14)
MONOCYTES NFR BLD AUTO: 5.6 % — SIGNIFICANT CHANGE UP (ref 2–14)
NEUTROPHILS # BLD AUTO: 8.6 K/UL — HIGH (ref 1.8–7.4)
NEUTROPHILS # BLD AUTO: 8.6 K/UL — HIGH (ref 1.8–7.4)
NEUTROPHILS NFR BLD AUTO: 78.6 % — HIGH (ref 43–77)
NEUTROPHILS NFR BLD AUTO: 78.6 % — HIGH (ref 43–77)
NRBC # BLD: 0 /100 WBCS — SIGNIFICANT CHANGE UP (ref 0–0)
NRBC # BLD: 0 /100 WBCS — SIGNIFICANT CHANGE UP (ref 0–0)
PLATELET # BLD AUTO: 153 K/UL — SIGNIFICANT CHANGE UP (ref 150–400)
PLATELET # BLD AUTO: 153 K/UL — SIGNIFICANT CHANGE UP (ref 150–400)
RBC # BLD: 4.11 M/UL — SIGNIFICANT CHANGE UP (ref 3.8–5.2)
RBC # BLD: 4.11 M/UL — SIGNIFICANT CHANGE UP (ref 3.8–5.2)
RBC # FLD: 13 % — SIGNIFICANT CHANGE UP (ref 10.3–14.5)
RBC # FLD: 13 % — SIGNIFICANT CHANGE UP (ref 10.3–14.5)
RH IG SCN BLD-IMP: POSITIVE — SIGNIFICANT CHANGE UP
WBC # BLD: 10.94 K/UL — HIGH (ref 3.8–10.5)
WBC # BLD: 10.94 K/UL — HIGH (ref 3.8–10.5)
WBC # FLD AUTO: 10.94 K/UL — HIGH (ref 3.8–10.5)
WBC # FLD AUTO: 10.94 K/UL — HIGH (ref 3.8–10.5)

## 2023-11-13 PROCEDURE — 88307 TISSUE EXAM BY PATHOLOGIST: CPT | Mod: 26

## 2023-11-13 RX ORDER — OXYTOCIN 10 UNIT/ML
41.67 VIAL (ML) INJECTION
Qty: 20 | Refills: 0 | Status: DISCONTINUED | OUTPATIENT
Start: 2023-11-13 | End: 2023-11-15

## 2023-11-13 RX ORDER — PRAMOXINE HYDROCHLORIDE 150 MG/15G
1 AEROSOL, FOAM RECTAL EVERY 4 HOURS
Refills: 0 | Status: DISCONTINUED | OUTPATIENT
Start: 2023-11-13 | End: 2023-11-15

## 2023-11-13 RX ORDER — OXYCODONE HYDROCHLORIDE 5 MG/1
5 TABLET ORAL
Refills: 0 | Status: DISCONTINUED | OUTPATIENT
Start: 2023-11-13 | End: 2023-11-15

## 2023-11-13 RX ORDER — IBUPROFEN 200 MG
600 TABLET ORAL EVERY 6 HOURS
Refills: 0 | Status: COMPLETED | OUTPATIENT
Start: 2023-11-13 | End: 2024-10-11

## 2023-11-13 RX ORDER — LANOLIN
1 OINTMENT (GRAM) TOPICAL EVERY 6 HOURS
Refills: 0 | Status: DISCONTINUED | OUTPATIENT
Start: 2023-11-13 | End: 2023-11-15

## 2023-11-13 RX ORDER — VANCOMYCIN HCL 1 G
1150 VIAL (EA) INTRAVENOUS EVERY 8 HOURS
Refills: 0 | Status: DISCONTINUED | OUTPATIENT
Start: 2023-11-13 | End: 2023-11-13

## 2023-11-13 RX ORDER — CHLORHEXIDINE GLUCONATE 213 G/1000ML
1 SOLUTION TOPICAL DAILY
Refills: 0 | Status: DISCONTINUED | OUTPATIENT
Start: 2023-11-13 | End: 2023-11-13

## 2023-11-13 RX ORDER — OXYTOCIN 10 UNIT/ML
VIAL (ML) INJECTION
Qty: 30 | Refills: 0 | Status: DISCONTINUED | OUTPATIENT
Start: 2023-11-13 | End: 2023-11-13

## 2023-11-13 RX ORDER — DIBUCAINE 1 %
1 OINTMENT (GRAM) RECTAL EVERY 6 HOURS
Refills: 0 | Status: DISCONTINUED | OUTPATIENT
Start: 2023-11-13 | End: 2023-11-15

## 2023-11-13 RX ORDER — ACETAMINOPHEN 500 MG
975 TABLET ORAL
Refills: 0 | Status: DISCONTINUED | OUTPATIENT
Start: 2023-11-13 | End: 2023-11-15

## 2023-11-13 RX ORDER — SODIUM CHLORIDE 9 MG/ML
1000 INJECTION, SOLUTION INTRAVENOUS
Refills: 0 | Status: DISCONTINUED | OUTPATIENT
Start: 2023-11-13 | End: 2023-11-13

## 2023-11-13 RX ORDER — SODIUM CHLORIDE 9 MG/ML
3 INJECTION INTRAMUSCULAR; INTRAVENOUS; SUBCUTANEOUS EVERY 8 HOURS
Refills: 0 | Status: DISCONTINUED | OUTPATIENT
Start: 2023-11-13 | End: 2023-11-15

## 2023-11-13 RX ORDER — SIMETHICONE 80 MG/1
80 TABLET, CHEWABLE ORAL EVERY 4 HOURS
Refills: 0 | Status: DISCONTINUED | OUTPATIENT
Start: 2023-11-13 | End: 2023-11-15

## 2023-11-13 RX ORDER — DIPHENHYDRAMINE HCL 50 MG
25 CAPSULE ORAL EVERY 6 HOURS
Refills: 0 | Status: DISCONTINUED | OUTPATIENT
Start: 2023-11-13 | End: 2023-11-15

## 2023-11-13 RX ORDER — CITRIC ACID/SODIUM CITRATE 300-500 MG
15 SOLUTION, ORAL ORAL EVERY 6 HOURS
Refills: 0 | Status: DISCONTINUED | OUTPATIENT
Start: 2023-11-13 | End: 2023-11-13

## 2023-11-13 RX ORDER — AER TRAVELER 0.5 G/1
1 SOLUTION RECTAL; TOPICAL EVERY 4 HOURS
Refills: 0 | Status: DISCONTINUED | OUTPATIENT
Start: 2023-11-13 | End: 2023-11-15

## 2023-11-13 RX ORDER — OXYCODONE HYDROCHLORIDE 5 MG/1
5 TABLET ORAL ONCE
Refills: 0 | Status: DISCONTINUED | OUTPATIENT
Start: 2023-11-13 | End: 2023-11-15

## 2023-11-13 RX ORDER — KETOROLAC TROMETHAMINE 30 MG/ML
30 SYRINGE (ML) INJECTION ONCE
Refills: 0 | Status: DISCONTINUED | OUTPATIENT
Start: 2023-11-13 | End: 2023-11-13

## 2023-11-13 RX ORDER — OXYTOCIN 10 UNIT/ML
333.33 VIAL (ML) INJECTION
Qty: 20 | Refills: 0 | Status: DISCONTINUED | OUTPATIENT
Start: 2023-11-13 | End: 2023-11-13

## 2023-11-13 RX ORDER — ONDANSETRON 8 MG/1
8 TABLET, FILM COATED ORAL EVERY 6 HOURS
Refills: 0 | Status: DISCONTINUED | OUTPATIENT
Start: 2023-11-13 | End: 2023-11-13

## 2023-11-13 RX ORDER — FENTANYL/BUPIVACAINE/NS/PF 2MCG/ML-.1
250 PLASTIC BAG, INJECTION (ML) INJECTION
Refills: 0 | Status: DISCONTINUED | OUTPATIENT
Start: 2023-11-13 | End: 2023-11-13

## 2023-11-13 RX ORDER — VANCOMYCIN HCL 1 G
1250 VIAL (EA) INTRAVENOUS EVERY 8 HOURS
Refills: 0 | Status: DISCONTINUED | OUTPATIENT
Start: 2023-11-13 | End: 2023-11-13

## 2023-11-13 RX ORDER — MAGNESIUM HYDROXIDE 400 MG/1
30 TABLET, CHEWABLE ORAL
Refills: 0 | Status: DISCONTINUED | OUTPATIENT
Start: 2023-11-13 | End: 2023-11-15

## 2023-11-13 RX ORDER — HYDROCORTISONE 1 %
1 OINTMENT (GRAM) TOPICAL EVERY 6 HOURS
Refills: 0 | Status: DISCONTINUED | OUTPATIENT
Start: 2023-11-13 | End: 2023-11-15

## 2023-11-13 RX ORDER — VANCOMYCIN HCL 1 G
1000 VIAL (EA) INTRAVENOUS EVERY 8 HOURS
Refills: 0 | Status: DISCONTINUED | OUTPATIENT
Start: 2023-11-13 | End: 2023-11-13

## 2023-11-13 RX ORDER — BENZOCAINE 10 %
1 GEL (GRAM) MUCOUS MEMBRANE EVERY 6 HOURS
Refills: 0 | Status: DISCONTINUED | OUTPATIENT
Start: 2023-11-13 | End: 2023-11-15

## 2023-11-13 RX ORDER — TETANUS TOXOID, REDUCED DIPHTHERIA TOXOID AND ACELLULAR PERTUSSIS VACCINE, ADSORBED 5; 2.5; 8; 8; 2.5 [IU]/.5ML; [IU]/.5ML; UG/.5ML; UG/.5ML; UG/.5ML
0.5 SUSPENSION INTRAMUSCULAR ONCE
Refills: 0 | Status: DISCONTINUED | OUTPATIENT
Start: 2023-11-13 | End: 2023-11-15

## 2023-11-13 RX ADMIN — SODIUM CHLORIDE 125 MILLILITER(S): 9 INJECTION, SOLUTION INTRAVENOUS at 09:30

## 2023-11-13 RX ADMIN — Medication 166.67 MILLIGRAM(S): at 13:00

## 2023-11-13 RX ADMIN — Medication 125 MILLIUNIT(S)/MIN: at 20:29

## 2023-11-13 RX ADMIN — Medication 250 MILLILITER(S): at 11:25

## 2023-11-13 RX ADMIN — Medication 30 MILLIGRAM(S): at 22:45

## 2023-11-13 RX ADMIN — Medication 30 MILLIGRAM(S): at 23:20

## 2023-11-13 RX ADMIN — Medication 2 MILLIUNIT(S)/MIN: at 12:30

## 2023-11-13 RX ADMIN — ONDANSETRON 8 MILLIGRAM(S): 8 TABLET, FILM COATED ORAL at 17:30

## 2023-11-13 NOTE — PRE-ANESTHESIA EVALUATION ADULT - NSANTHADDINFOFT_GEN_ALL_CORE
Anesthetic plan reviewed. Patient safety emphasized.     Discussed risks of general anesthesia and peripheral nerve block - including risks of bleeding, infection, and damage to surrounding structures (including nerve injury). All questions answered and patient elects to proceed.

## 2023-11-13 NOTE — PATIENT PROFILE OB - BMI (KG/M2)
Recommended patient to use over the counter amlactin ultra cream on the area. Recommended patient to come back in about three to four weeks for a follow up. Detail Level: Detailed She reports problem since fall. She says 10 treatments to date. \\[place] both frozen and scraped\\nShe worries spreads with sweat\\n\\nDenies any sexual activity. Explained can be contracted from skin to skin contact. Given duration of outbreak suspicious she is being reexposed. Discussed with pt\\n\\nMany lesions she worried about were keratosis [place] or folliculitis at vulva from recent laser hair removal. Advised stop laser until molluscum resolve. Do not shave over treated area\\n\\nRv 3 weeks 24

## 2023-11-13 NOTE — PATIENT PROFILE OB - FALL HARM RISK - UNIVERSAL INTERVENTIONS
Bed in lowest position, wheels locked, appropriate side rails in place/Call bell, personal items and telephone in reach/Instruct patient to call for assistance before getting out of bed or chair/Non-slip footwear when patient is out of bed/[place] to call system/Physically safe environment - no spills, clutter or unnecessary equipment/Purposeful Proactive Rounding/Room/bathroom lighting operational, light cord in reach

## 2023-11-13 NOTE — PRE-ANESTHESIA EVALUATION ADULT - NSANTHOSAYNRD_GEN_A_CORE
No. SHERRON screening performed.  STOP BANG Legend: 0-2 = LOW Risk; 3-4 = INTERMEDIATE Risk; 5-8 = HIGH Risk

## 2023-11-13 NOTE — PRE-ANESTHESIA EVALUATION ADULT - NSANTHPMHFT_GEN_ALL_CORE
44yo F  presenting in active labor, requesting labor epidural.     Prior delivery c/b shoulder dystocia; noted to also have large for GA fetus in current pregnancy.    Drug allergy: penicillin - anaphylaxis

## 2023-11-14 ENCOUNTER — TRANSCRIPTION ENCOUNTER (OUTPATIENT)
Age: 43
End: 2023-11-14

## 2023-11-14 LAB
T PALLIDUM AB TITR SER: NEGATIVE — SIGNIFICANT CHANGE UP
T PALLIDUM AB TITR SER: NEGATIVE — SIGNIFICANT CHANGE UP

## 2023-11-14 RX ORDER — ACETAMINOPHEN 500 MG
3 TABLET ORAL
Qty: 0 | Refills: 0 | DISCHARGE
Start: 2023-11-14

## 2023-11-14 RX ORDER — KETOROLAC TROMETHAMINE 30 MG/ML
30 SYRINGE (ML) INJECTION EVERY 6 HOURS
Refills: 0 | Status: DISCONTINUED | OUTPATIENT
Start: 2023-11-14 | End: 2023-11-14

## 2023-11-14 RX ORDER — IBUPROFEN 200 MG
600 TABLET ORAL EVERY 6 HOURS
Refills: 0 | Status: DISCONTINUED | OUTPATIENT
Start: 2023-11-14 | End: 2023-11-14

## 2023-11-14 RX ORDER — FAMOTIDINE 10 MG/ML
20 INJECTION INTRAVENOUS ONCE
Refills: 0 | Status: COMPLETED | OUTPATIENT
Start: 2023-11-14 | End: 2023-11-14

## 2023-11-14 RX ORDER — BENZOCAINE 10 %
1 GEL (GRAM) MUCOUS MEMBRANE
Qty: 0 | Refills: 0 | DISCHARGE
Start: 2023-11-14

## 2023-11-14 RX ORDER — FAMOTIDINE 10 MG/ML
20 INJECTION INTRAVENOUS DAILY
Refills: 0 | Status: DISCONTINUED | OUTPATIENT
Start: 2023-11-14 | End: 2023-11-15

## 2023-11-14 RX ADMIN — FAMOTIDINE 20 MILLIGRAM(S): 10 INJECTION INTRAVENOUS at 23:39

## 2023-11-14 RX ADMIN — Medication 30 MILLIGRAM(S): at 15:09

## 2023-11-14 RX ADMIN — Medication 975 MILLIGRAM(S): at 12:18

## 2023-11-14 RX ADMIN — AER TRAVELER 1 APPLICATION(S): 0.5 SOLUTION RECTAL; TOPICAL at 15:09

## 2023-11-14 RX ADMIN — Medication 1 APPLICATION(S): at 15:09

## 2023-11-14 RX ADMIN — Medication 1 SPRAY(S): at 15:08

## 2023-11-14 RX ADMIN — Medication 975 MILLIGRAM(S): at 18:08

## 2023-11-14 RX ADMIN — Medication 975 MILLIGRAM(S): at 03:23

## 2023-11-14 RX ADMIN — Medication 975 MILLIGRAM(S): at 02:58

## 2023-11-14 RX ADMIN — SODIUM CHLORIDE 3 MILLILITER(S): 9 INJECTION INTRAMUSCULAR; INTRAVENOUS; SUBCUTANEOUS at 14:00

## 2023-11-14 RX ADMIN — Medication 30 MILLIGRAM(S): at 08:19

## 2023-11-14 NOTE — DISCHARGE NOTE OB - PATIENT PORTAL LINK FT
You can access the FollowMyHealth Patient Portal offered by [facility] by registering at the following website: [email or web address]. By joining [employer]’s FollowMyHealth portal, you will also be able to view your health information using other applications (apps) compatible with our system.

## 2023-11-14 NOTE — DISCHARGE NOTE OB - MEDICATION SUMMARY - MEDICATIONS TO TAKE
I will START or STAY ON the medications listed below when I get home from the hospital:    acetaminophen 325 mg oral tablet  -- 3 tab(s) by mouth every 6 hours  -- Indication: For Pain    benzocaine 20% topical spray  -- 1 Apply on skin to affected area every 6 hours As needed for Perineal discomfort  -- Indication: For Perineal pain    Prenatal Multivitamins with Folic Acid 1 mg oral tablet  -- 1 tab(s) by mouth once a day  -- Indication: For Postpartum

## 2023-11-14 NOTE — DISCHARGE NOTE OB - CARE PROVIDER_API CALL
Lexy Torres  Obstetrics and Gynecology  [address] [place] [address]  Phone: [phone]  Fax: [phone]  Follow Up Time: 2 months

## 2023-11-14 NOTE — DISCHARGE NOTE OB - NS MD DC FALL RISK RISK
For information on Fall & Injury Prevention, visit: [email or web address] OR  [email or web address] OR  https://www.cdc.gov/steadi/patient.html

## 2023-11-14 NOTE — LACTATION INITIAL EVALUATION - LACTATION INTERVENTIONS
initiate/review safe skin-to-skin/initiate/review hand expression/initiate/review techniques for position and latch/post discharge community resources provided/review techniques to increase milk supply/reviewed components of an effective feeding and at least 8 effective feedings per day required/reviewed importance of monitoring infant diapers, the breastfeeding log, and minimum output each day/reviewed benefits and recommendations for rooming in/reviewed feeding on demand/by cue at least 8 times a day/recommended follow-up with pediatrician within 24 hours of discharge/reviewed indications of inadequate milk transfer that would require supplementation

## 2023-11-14 NOTE — LACTATION INITIAL EVALUATION - NS LACT CON REASON FOR REQ
39 wk gestation baby, about 14.5  hrs old at this time. Placed the baby STS with the mother while I provided breastfeeding education and explained normal  behaviour and the milk production feedback system. Assisted with positioning in a cross cradle hold and taught latch strategies. Baby was able to latch deeply and is feeding well, rhythmically sucking between short pauses of rest. Mother to continue with STS when possible, room-in, and feed as per cues at least 8-12x/ day. To f/u as needed./multiparous mom/staff request/patient request

## 2023-11-15 VITALS
SYSTOLIC BLOOD PRESSURE: 94 MMHG | DIASTOLIC BLOOD PRESSURE: 61 MMHG | HEART RATE: 68 BPM | OXYGEN SATURATION: 99 % | TEMPERATURE: 98 F | RESPIRATION RATE: 18 BRPM

## 2023-11-15 PROCEDURE — 85025 COMPLETE CBC W/AUTO DIFF WBC: CPT

## 2023-11-15 PROCEDURE — 86901 BLOOD TYPING SEROLOGIC RH(D): CPT

## 2023-11-15 PROCEDURE — 88307 TISSUE EXAM BY PATHOLOGIST: CPT

## 2023-11-15 PROCEDURE — 86850 RBC ANTIBODY SCREEN: CPT

## 2023-11-15 PROCEDURE — 36415 COLL VENOUS BLD VENIPUNCTURE: CPT

## 2023-11-15 PROCEDURE — 59050 FETAL MONITOR W/REPORT: CPT

## 2023-11-15 PROCEDURE — 86780 TREPONEMA PALLIDUM: CPT

## 2023-11-15 PROCEDURE — 86900 BLOOD TYPING SEROLOGIC ABO: CPT

## 2023-11-15 RX ADMIN — Medication 975 MILLIGRAM(S): at 08:55

## 2023-11-15 NOTE — PROGRESS NOTE ADULT - ASSESSMENT
A/P 43y s/p , PPD2, stable, meeting postpartum milestones   - Pain: well controlled on tylenol/motrin  - GI: Tolerating regular diet  - : urinating without difficulty/pain  - DVT prophylaxis: ambulating frequently  - Dispo: PPD 2, unless otherwise specified    
A/P 43y s/p VD, ppd 1, urinary retention    will dc leonard and rpt TOV  reg diet  oob/ambulation  breastfeeding  planning for dc tomorrow
A/P 43y s/p , PPD# 1, stable, meeting postpartum milestones   - Pain: well controlled on tylenol/motrin  - GI: Tolerating regular diet  - : urinating without difficulty/pain  - DVT prophylaxis: ambulating frequently  - Dispo: PPD 2, unless otherwise specified

## 2023-11-15 NOTE — PROGRESS NOTE ADULT - SUBJECTIVE AND OBJECTIVE BOX
Patient evaluated at bedside this morning, resting comfortable in bed, no acute events overnight.  She reports pain is well controlled with tylenol and motrin.  She denies heavy vaginal bleeding. She has been ambulating without assistance, voiding spontaneously.  Tolerating food well, without nausea/vomit.    She denies headache, scotoma, chest pain, shortness of breath, RUQ/epigastric pain, nausea, vomiting, fever, chills, dizziness, palpitations.    Physical Exam:  T(C): 36.6 (11-14-23 @ 06:00), Max: 37.3 (11-13-23 @ 20:45)  HR: 64 (11-14-23 @ 06:00) (64 - 99)  BP: 90/53 (11-14-23 @ 06:00) (86/51 - 106/64)  RR: 16 (11-14-23 @ 06:00) (16 - 18)  SpO2: 95% (11-14-23 @ 06:00) (95% - 100%)    GA: comfortable appearing, NAD  Pulm: no increased work of breathing  Abd: soft, nontender, nondistended, no rebound or guarding, uterus firm.  Extremities: no calf tenderness, no lower extremity edema                          13.6   10.94 )-----------( 153      ( 13 Nov 2023 09:57 )             38.7           acetaminophen     Tablet .. 975 milliGRAM(s) Oral <User Schedule>  benzocaine 20%/menthol 0.5% Spray 1 Spray(s) Topical every 6 hours PRN  dibucaine 1% Ointment 1 Application(s) Topical every 6 hours PRN  diphenhydrAMINE 25 milliGRAM(s) Oral every 6 hours PRN  diphtheria/tetanus/pertussis (acellular) Vaccine (Adacel) 0.5 milliLiter(s) IntraMuscular once  hydrocortisone 1% Cream 1 Application(s) Topical every 6 hours PRN  ibuprofen  Tablet. 600 milliGRAM(s) Oral every 6 hours  lanolin Ointment 1 Application(s) Topical every 6 hours PRN  magnesium hydroxide Suspension 30 milliLiter(s) Oral two times a day PRN  oxyCODONE    IR 5 milliGRAM(s) Oral every 3 hours PRN  oxyCODONE    IR 5 milliGRAM(s) Oral once PRN  oxytocin Infusion 41.667 milliUNIT(s)/Min IV Continuous <Continuous>  pramoxine 1%/zinc 5% Cream 1 Application(s) Topical every 4 hours PRN  prenatal multivitamin 1 Tablet(s) Oral daily  simethicone 80 milliGRAM(s) Chew every 4 hours PRN  sodium chloride 0.9% lock flush 3 milliLiter(s) IV Push every 8 hours  witch hazel Pads 1 Application(s) Topical every 4 hours PRN  
Patient evaluated at bedside.   She reports pain is well controlled with OPM  Pt had urinary retention overnight, leonard catheter in place. Denies light headedness/dizziness, n/v/f/c, cp, or dyspnea.    Physical Exam:  T(C): 36.7 (11-14-23 @ 09:08), Max: 36.8 (11-14-23 @ 02:00)  HR: 67 (11-14-23 @ 09:08) (64 - 99)  BP: 91/56 (11-14-23 @ 09:08) (86/51 - 99/68)  RR: 18 (11-14-23 @ 09:08) (16 - 18)  SpO2: 99% (11-14-23 @ 09:08) (95% - 100%)    GA: NAD, A+0 x 3  Breasts: soft, nontender, no palpable masses  Abd: + BS, soft, nontender, nondistended, no rebound or guarding, uterus firm at midline, 2  fb below umbilicus  : lochia WNL, catheter in place draining clear urine  Extremities: no swelling or calf tenderness                            13.6   10.94 )-----------( 153      ( 13 Nov 2023 09:57 )             38.7         
Patient evaluated at bedside this morning, resting comfortable in bed, no acute events overnight.  She reports pain is well controlled with tylenol and motrin.  She denies headache, dizziness, chest pain, palpitations, shortness of breath, nausea, vomiting, fever, chills, heavy vaginal bleeding. She has been ambulating without assistance, voiding spontaneously.  Tolerating food well, without nausea/vomit.  Patient notes that she has hemorrhoids and asked what to do for them. Informed patient to take stool softeners OTC upon discharge.    Physical Exam:  T(C): 36.4 (11-15-23 @ 05:42), Max: 36.7 (11-14-23 @ 19:01)  HR: 55 (11-15-23 @ 05:42) (55 - 77)  BP: 104/67 (11-15-23 @ 05:42) (99/66 - 104/67)  RR: 17 (11-15-23 @ 05:42) (16 - 17)  SpO2: 99% (11-15-23 @ 05:42) (99% - 99%)    GA: NAD, A&O x 3  Pulm: no increased work of breathing  Abd: soft, nontender, nondistended, no rebound or guarding, uterus firm.  Extremities: no swelling or calf tenderness                          13.6   10.94 )-----------( 153      ( 13 Nov 2023 09:57 )             38.7           acetaminophen     Tablet .. 975 milliGRAM(s) Oral <User Schedule>  benzocaine 20%/menthol 0.5% Spray 1 Spray(s) Topical every 6 hours PRN  dibucaine 1% Ointment 1 Application(s) Topical every 6 hours PRN  diphenhydrAMINE 25 milliGRAM(s) Oral every 6 hours PRN  diphtheria/tetanus/pertussis (acellular) Vaccine (Adacel) 0.5 milliLiter(s) IntraMuscular once  famotidine    Tablet 20 milliGRAM(s) Oral daily PRN  hydrocortisone 1% Cream 1 Application(s) Topical every 6 hours PRN  ketorolac   Injectable 30 milliGRAM(s) IV Push every 6 hours PRN  lanolin Ointment 1 Application(s) Topical every 6 hours PRN  magnesium hydroxide Suspension 30 milliLiter(s) Oral two times a day PRN  oxyCODONE    IR 5 milliGRAM(s) Oral every 3 hours PRN  oxyCODONE    IR 5 milliGRAM(s) Oral once PRN  oxytocin Infusion 41.667 milliUNIT(s)/Min IV Continuous <Continuous>  pramoxine 1%/zinc 5% Cream 1 Application(s) Topical every 4 hours PRN  prenatal multivitamin 1 Tablet(s) Oral daily  simethicone 80 milliGRAM(s) Chew every 4 hours PRN  sodium chloride 0.9% lock flush 3 milliLiter(s) IV Push every 8 hours  witch hazel Pads 1 Application(s) Topical every 4 hours PRN

## 2023-11-19 DIAGNOSIS — Z88.0 ALLERGY STATUS TO PENICILLIN: ICD-10-CM

## 2023-11-19 DIAGNOSIS — R33.9 RETENTION OF URINE, UNSPECIFIED: ICD-10-CM

## 2023-11-19 DIAGNOSIS — Z28.09 IMMUNIZATION NOT CARRIED OUT BECAUSE OF OTHER CONTRAINDICATION: ICD-10-CM

## 2023-11-19 DIAGNOSIS — O36.63X0 MATERNAL CARE FOR EXCESSIVE FETAL GROWTH, THIRD TRIMESTER, NOT APPLICABLE OR UNSPECIFIED: ICD-10-CM

## 2023-11-19 DIAGNOSIS — Z3A.39 39 WEEKS GESTATION OF PREGNANCY: ICD-10-CM

## 2023-11-22 LAB
SURGICAL PATHOLOGY STUDY: SIGNIFICANT CHANGE UP
SURGICAL PATHOLOGY STUDY: SIGNIFICANT CHANGE UP

## 2024-03-25 ENCOUNTER — NON-APPOINTMENT (OUTPATIENT)
Age: 44
End: 2024-03-25

## 2024-03-29 ENCOUNTER — APPOINTMENT (OUTPATIENT)
Dept: OTOLARYNGOLOGY | Facility: CLINIC | Age: 44
End: 2024-03-29
Payer: COMMERCIAL

## 2024-03-29 VITALS — BODY MASS INDEX: 19.29 KG/M2 | WEIGHT: 113 LBS | HEIGHT: 64 IN

## 2024-03-29 DIAGNOSIS — J34.89 OTHER SPECIFIED DISORDERS OF NOSE AND NASAL SINUSES: ICD-10-CM

## 2024-03-29 DIAGNOSIS — R04.0 EPISTAXIS: ICD-10-CM

## 2024-03-29 PROCEDURE — 31231 NASAL ENDOSCOPY DX: CPT

## 2024-03-29 PROCEDURE — 99213 OFFICE O/P EST LOW 20 MIN: CPT | Mod: 25

## 2024-03-29 NOTE — ASSESSMENT
[FreeTextEntry1] : CITLALI CABRALES has about one year of right nasal obstruction at night in bed. Her septum looks good. She ah some right valve collapse. I suggested Breathe Right strips. I suggested she consult with Dr. Erika Godoy.

## 2024-03-29 NOTE — PHYSICAL EXAM
[TextEntry] : PHYSICAL EXAM  General: The patient was alert and oriented and in no distress. Voice was normal.  Neurologic: Cranial Nerves II-XII intact PERRLA There was no significant nystagmus or disconjugate gaze noted.  EOM's: Normal  Face: The patient had no facial asymmetry or mass. The skin was unremarkable.  Ears: External ears were normal without deformity. Ear canals were normal. Tympanic membranes were intact and normal. No perforation or effusion  Nose:  The external nose had no significant deformity.  There was no facial tenderness.  On anterior rhinoscopy, the nasal mucosa was normal.  The anterior septum was normal. There were no visualized polyps purulence  or masses.  Oral cavity: The oral mucosa was normal. The oral and base of tongue were clear and without mass. The gingival and buccal mucosa were moist and without lesions. The palate moved well. There was no cleft palate. There appeared to be good salivary flow.   There was no pus, erythema or mass in the oral cavity/oropharynx.  Neck:  The neck was symmetrical. The parotid and submandibular glands were normal without masses. The trachea was midline and there was no unusual crepitus. Thyroid was smooth and nontender and no masses were palpated. Cervical adenopathy- none.  Procedure: Nasal Endoscopy  Diagnosis: Chronic sinusitis  Dr. Floyd Espinoza  Anesthesia: none Procedure: The fiberoptic endoscope was introduced into the right nostril and passed along the floor of the nose and then  along the middle meatus. The same procedure was carried out  on the left side.  Findings:   Septum: mildly deviated bilaterally         Right:    Middle Turbinate: normal Middle meatus congested no polyps or pus  Superior meatus:normal SER:normal Inferior Turbinate: normal  Left:      Middle Turbinate: normal Middle meatus congested no polyps or pus Superior meatus:normal SER:normal Inferior Turbinate: normal

## 2024-03-29 NOTE — REVIEW OF SYSTEMS
[Nasal Congestion] : nasal congestion [Negative] : Endocrine [Patient Intake Form Reviewed] : Patient intake form was reviewed

## 2024-03-29 NOTE — HISTORY OF PRESENT ILLNESS
[de-identified] : CITLALI BUCHANAN  is a 44 year woman with a history of about one year of right nasal obstruction. She had septoplasty about 10 years ago. This included nasal valve surgery. She has a history of epistaxis which improved after the surgery. She is hesitant to use sprays because of this.

## 2024-07-29 NOTE — PATIENT PROFILE OB - NSSDOHINSULT_OBGYN_A_OB
recombinant zoster vaccine.  People sometimes faint after medical procedures, including vaccination. Tell your provider if you feel dizzy or have vision changes or ringing in the ears.  As with any medicine, there is a very remote chance of a vaccine causing a severe allergic reaction, other serious injury, or death.  What if there is a serious problem?  An allergic reaction could occur after the vaccinated person leaves the clinic. If you see signs of a severe allergic reaction (hives, swelling of the face and throat, difficulty breathing, a fast heartbeat, dizziness, or weakness), call 9-1-1 and get the person to the nearest hospital.  For other signs that concern you, call your health care provider.  Adverse reactions should be reported to the Vaccine Adverse Event Reporting System (VAERS). Your health care provider will usually file this report, or you can do it yourself. Visit the VAERS website at www.vaers.[facility].gov or call 1-177.610.1158. VAERS is only for reporting reactions, and VAERS staff members do not give medical advice.  How can I learn more?  Ask your health care provider.  Call your local or state health department.  Visit the website of the Food and Drug Administration (FDA) for vaccine package inserts and additional information at www.fda.gov/vaccines-blood-biologics/vaccines.  Contact the Centers for Disease Control and Prevention (CDC):  Call 1-667.128.4635 (9-848-LDG-INFO) or  Visit CDC’s website at www.cdc.gov/vaccines.  Vaccine Information Statement  Recombinant Zoster Vaccine  02/04/2022  Department of Health and Human Services  Centers for Disease Control and Prevention  Many vaccine information statements are available in Telugu and other languages. See www.immunize.org/vis  Hojas de información sobre vacunas están disponibles en español y en muchos otros idiomas. Visite http://www.immunize.org/vis  Care instructions adapted under license by [employer]. If you have questions about a  never